# Patient Record
Sex: MALE | Race: WHITE | Employment: FULL TIME
[De-identification: names, ages, dates, MRNs, and addresses within clinical notes are randomized per-mention and may not be internally consistent; named-entity substitution may affect disease eponyms.]

---

## 2022-11-15 ENCOUNTER — NURSE TRIAGE (OUTPATIENT)
Dept: OTHER | Facility: CLINIC | Age: 32
End: 2022-11-15

## 2022-11-15 NOTE — TELEPHONE ENCOUNTER
Location of patient: [facility] call from [facility] at [facility] AND [facility]; Patient with Red Flag Complaint requesting to establish care with [place]. Subjective: Caller states \"high blood pressure\"     Current Symptoms: see above, he went to [facility] for employment physical, bp was 115/110, could not get clearance for new job. Denies symptoms. Hx of partial hip replacement    Onset: 2 weeks ago; sudden    Associated Symptoms: NA    Pain Severity: 0/10; N/A; none    Temperature: n/a     What has been tried: nothing    LMP: NA Pregnant: NA    Recommended disposition: See PCP within 3 Days, writer advised [facility], transferred to [facility] ([facility]) for new patient appointment. Care advice provided, patient verbalizes understanding; denies any other questions or concerns; instructed to call back for any new or worsening symptoms. Patient/Caller agrees with recommended disposition; writer provided warm transfer to [facility] at [facility] AND [facility] for appointment scheduling    Attention Provider: Thank you for allowing me to participate in the care of your patient. The patient was connected to triage in response to information provided to the [facility]. Please do not respond through this encounter as the response is not directed to a shared pool.     Reason for Disposition   Systolic BP >= 918 OR Diastolic >= 529    Protocols used: Blood Pressure - High-ADULT-OH

## 2022-11-16 ENCOUNTER — OFFICE VISIT (OUTPATIENT)
Dept: FAMILY MEDICINE CLINIC | Age: 32
End: 2022-11-16
Payer: COMMERCIAL

## 2022-11-16 ENCOUNTER — TELEPHONE (OUTPATIENT)
Dept: FAMILY MEDICINE CLINIC | Age: 32
End: 2022-11-16

## 2022-11-16 VITALS
WEIGHT: 242 LBS | HEART RATE: 109 BPM | DIASTOLIC BLOOD PRESSURE: 92 MMHG | BODY MASS INDEX: 38.89 KG/M2 | HEIGHT: 66 IN | OXYGEN SATURATION: 96 % | SYSTOLIC BLOOD PRESSURE: 130 MMHG

## 2022-11-16 DIAGNOSIS — F90.9 ATTENTION DEFICIT HYPERACTIVITY DISORDER (ADHD), UNSPECIFIED ADHD TYPE: ICD-10-CM

## 2022-11-16 DIAGNOSIS — J45.20 MILD INTERMITTENT ASTHMA WITHOUT COMPLICATION: ICD-10-CM

## 2022-11-16 DIAGNOSIS — I10 ESSENTIAL HYPERTENSION: Primary | ICD-10-CM

## 2022-11-16 DIAGNOSIS — K21.00 GASTROESOPHAGEAL REFLUX DISEASE WITH ESOPHAGITIS WITHOUT HEMORRHAGE: ICD-10-CM

## 2022-11-16 PROBLEM — J45.909 ASTHMA: Status: ACTIVE | Noted: 2022-11-16

## 2022-11-16 PROCEDURE — 99204 OFFICE O/P NEW MOD 45 MIN: CPT | Performed by: STUDENT IN AN ORGANIZED HEALTH CARE EDUCATION/TRAINING PROGRAM

## 2022-11-16 PROCEDURE — G8427 DOCREV CUR MEDS BY ELIG CLIN: HCPCS | Performed by: STUDENT IN AN ORGANIZED HEALTH CARE EDUCATION/TRAINING PROGRAM

## 2022-11-16 PROCEDURE — 3080F DIAST BP >= 90 MM HG: CPT | Performed by: STUDENT IN AN ORGANIZED HEALTH CARE EDUCATION/TRAINING PROGRAM

## 2022-11-16 PROCEDURE — 4004F PT TOBACCO SCREEN RCVD TLK: CPT | Performed by: STUDENT IN AN ORGANIZED HEALTH CARE EDUCATION/TRAINING PROGRAM

## 2022-11-16 PROCEDURE — G8417 CALC BMI ABV UP PARAM F/U: HCPCS | Performed by: STUDENT IN AN ORGANIZED HEALTH CARE EDUCATION/TRAINING PROGRAM

## 2022-11-16 PROCEDURE — G8484 FLU IMMUNIZE NO ADMIN: HCPCS | Performed by: STUDENT IN AN ORGANIZED HEALTH CARE EDUCATION/TRAINING PROGRAM

## 2022-11-16 PROCEDURE — 3075F SYST BP GE 130 - 139MM HG: CPT | Performed by: STUDENT IN AN ORGANIZED HEALTH CARE EDUCATION/TRAINING PROGRAM

## 2022-11-16 RX ORDER — ALBUTEROL SULFATE 90 UG/1
2 AEROSOL, METERED RESPIRATORY (INHALATION) 4 TIMES DAILY PRN
Qty: 18 G | Refills: 5 | Status: SHIPPED | OUTPATIENT
Start: 2022-11-16

## 2022-11-16 RX ORDER — PANTOPRAZOLE SODIUM 40 MG/1
TABLET, DELAYED RELEASE ORAL
COMMUNITY
Start: 2022-10-05 | End: 2022-11-29 | Stop reason: SDUPTHER

## 2022-11-16 RX ORDER — BUPROPION HYDROCHLORIDE 300 MG/1
TABLET ORAL
COMMUNITY
Start: 2022-11-02

## 2022-11-16 SDOH — ECONOMIC STABILITY: FOOD INSECURITY: WITHIN THE PAST 12 MONTHS, YOU WORRIED THAT YOUR FOOD WOULD RUN OUT BEFORE YOU GOT MONEY TO BUY MORE.: NEVER TRUE

## 2022-11-16 SDOH — ECONOMIC STABILITY: FOOD INSECURITY: WITHIN THE PAST 12 MONTHS, THE FOOD YOU BOUGHT JUST DIDN'T LAST AND YOU DIDN'T HAVE MONEY TO GET MORE.: NEVER TRUE

## 2022-11-16 ASSESSMENT — PATIENT HEALTH QUESTIONNAIRE - PHQ9
SUM OF ALL RESPONSES TO PHQ QUESTIONS 1-9: 2
SUM OF ALL RESPONSES TO PHQ QUESTIONS 1-9: 2
1. LITTLE INTEREST OR PLEASURE IN DOING THINGS: 1
SUM OF ALL RESPONSES TO PHQ QUESTIONS 1-9: 2
SUM OF ALL RESPONSES TO PHQ QUESTIONS 1-9: 2
2. FEELING DOWN, DEPRESSED OR HOPELESS: 1
SUM OF ALL RESPONSES TO PHQ9 QUESTIONS 1 & 2: 2

## 2022-11-16 ASSESSMENT — ENCOUNTER SYMPTOMS
ABDOMINAL PAIN: 0
SORE THROAT: 0
SHORTNESS OF BREATH: 0

## 2022-11-16 ASSESSMENT — SOCIAL DETERMINANTS OF HEALTH (SDOH): HOW HARD IS IT FOR YOU TO PAY FOR THE VERY BASICS LIKE FOOD, HOUSING, MEDICAL CARE, AND HEATING?: NOT VERY HARD

## 2022-11-16 NOTE — TELEPHONE ENCOUNTER
Pt states he missed a call from Dr. La Carbone on which pharmacy he uses. He said  [facility] on [address] In [place].

## 2022-11-16 NOTE — PROGRESS NOTES
[facility]  Establish care visit       Karina Medina (:  [phone]) is a 27 y.o. male, here to establish care. Chief Complaint   Patient presents with    Blood Pressure Check          ASSESSMENT/ PLAN  1. Essential hypertension  Blood pressure only slightly elevated today, while not on medication. We will continue dietary changes. Patient with blood pressure of 130/92.  92 is only slightly elevated with a goal of diastolic being under 90. Therefore, signed patient paperwork that he is okay to participate in his work health examination at this time. We will have patient follow-up in 3 months. 2. Gastroesophageal reflux disease with esophagitis without hemorrhage  Patient is on pantoprazole at this time. Follows with gastroenterology for Lockwood's esophagus. Has an upcoming follow-up appointment with them. Patient continues to take pantoprazole at this time. 3. Mild intermittent asthma without complication  Not very well controlled. Patient with regular symptoms weekly. Patient not on any controller medication, nor is he on rescue inhaler. Will start trial of rescue inhaler and follow-up with patient over the course of the next couple of months to try to get better control of his asthma symptoms. - albuterol sulfate HFA (VENTOLIN HFA) 108 (90 Base) MCG/ACT inhaler; Inhale 2 puffs into the lungs 4 times daily as needed for Wheezing  Dispense: 18 g; Refill: 5    4. Attention deficit hyperactivity disorder (ADHD), unspecified ADHD type  Stable while not on medication. It is possible that patient may slowly be growing out of his childhood ADHD symptoms, they are definitely less severe than the used to be according to the patient. Outside records from previous provider including lab work and outside office visit notes were reviewed. No follow-ups on file.     HPI  Patient is a 75-year-old male with a past medical history of GERD, hypertension, asthma, and ADHD, who presents to establish care and to discuss some of the above concerns. In regard to his acid reflux, the patient is on pantoprazole, which she has recently started over the past 1 to 2 months. Patient reports that he follows with a gastroenterologist, who told him that he had esophageal polyps that were precancerous, patient reports being told that he had Lockwood's esophagus. Patient continues to follow with [facility] [facility]. Patient continues to take pantoprazole medication and does not need any refills of this medication at this time. In regard to his hypertension, the patient reports that he was recently attempting to get initial testing done for a new job, but had elevated blood pressure at the time. Because of this, patient was told to get cleared by a physician before he could participate in the activity. Patient saw his previous provider, who noted a blood pressure of 144/110, and patient was not permitted to continue with the work testing. Today, however, blood pressure is noted to be only slightly elevated at 130/92. Patient does not measure his blood pressure at home. Patient denies headache, chest pain, shortness of breath, new leg swelling. In regard to his history of asthma, the patient does not have albuterol. He smokes 1.5 to 2 packs/day. He is precontemplative about quitting. He reports regular symptoms and may need an albuterol inhaler to help control symptoms. He also reports having seasonal allergies, which can sometimes have an effect on his asthma. Patient has a history of ADHD during childhood. He reports that has gotten a little bit better over the course of him getting older. He has not on treatment, but is able to tolerate his symptoms with work at this time. Per chart review, patient had a lot of lab work completed in April and last month with the gastroenterologist.  Patient is not due for any specific lab work at this time.   Patient follows no particular diet, often does not eat fruits and vegetables. He gets exercise at work, as he has a very active job. He uses tobacco products 1.5 to 2 packs/day. He has smoked 1.5 packs/day for the past 15 years. He occasionally uses alcohol. He does not use drugs. ROS  Review of Systems   Constitutional:  Negative for fever. HENT:  Negative for sore throat. Eyes:  Negative for visual disturbance. Respiratory:  Negative for shortness of breath. Cardiovascular:  Negative for chest pain, palpitations and leg swelling. Gastrointestinal:  Negative for abdominal pain. Genitourinary:  Negative for difficulty urinating. Skin:  Negative for rash. Neurological:  Negative for dizziness. Hematological:  Does not bruise/bleed easily. HISTORIES  Current Outpatient Medications on File Prior to Visit   Medication Sig Dispense Refill    pantoprazole (PROTONIX) 40 MG tablet       buPROPion (WELLBUTRIN XL) 300 MG extended release tablet        No current facility-administered medications on file prior to visit. Allergies   Allergen Reactions    Eggs Or Egg-Derived Products        History reviewed. No pertinent past medical history. Patient Active Problem List   Diagnosis    Asthma    Attention deficit hyperactivity disorder (ADHD)    Essential hypertension    GERD (gastroesophageal reflux disease)    Juvenile osteochondrosis of hip and pelvis    Primary osteoarthritis of left hip         History reviewed. No pertinent surgical history.     Social History     Socioeconomic History    Marital status: Single     Spouse name: Not on file    Number of children: Not on file    Years of education: Not on file    Highest education level: Not on file   Occupational History    Not on file   Tobacco Use    Smoking status: Every Day     Packs/day: 1.50     Years: 15.00     Pack years: 22.50     Types: Cigarettes    Smokeless tobacco: Never   Substance and Sexual Activity    Alcohol use: Not on file    Drug use: Not on file    Sexual activity: Not on file   Other Topics Concern    Not on file   Social History Narrative    Not on file     Social Determinants of Health     Financial Resource Strain: Low Risk     Difficulty of Paying Living Expenses: Not very hard   Food Insecurity: No Food Insecurity    Worried About Running Out of Food in the Last Year: Never true    Ran Out of Food in the Last Year: Never true   Transportation Needs: Not on file   Physical Activity: Not on file   Stress: Not on file   Social Connections: Not on file   Intimate Partner Violence: Not on file   Housing Stability: Not on file        History reviewed. No pertinent family history. PE  Vitals:    11/16/22 0725   BP: (!) 130/92   Site: Left Upper Arm   Position: Sitting   Pulse: (!) 109   SpO2: 96%   Weight: 242 lb (109.8 kg)   Height: 5' 6\" (1.676 m)     Estimated body mass index is 39.06 kg/m² as calculated from the following:    Height as of this encounter: 5' 6\" (1.676 m). Weight as of this encounter: 242 lb (109.8 kg). Physical Exam  Vitals reviewed. Constitutional:       General: He is not in acute distress. Appearance: Normal appearance. He is not ill-appearing, toxic-appearing or diaphoretic. HENT:      Head: Normocephalic and atraumatic. Right Ear: External ear normal.      Left Ear: External ear normal.      Mouth/Throat:      Mouth: Mucous membranes are moist.   Eyes:      General: No scleral icterus. Conjunctiva/sclera: Conjunctivae normal.   Cardiovascular:      Rate and Rhythm: Normal rate and regular rhythm. Heart sounds: Normal heart sounds. No murmur heard. No gallop. Pulmonary:      Effort: Pulmonary effort is normal. No respiratory distress. Breath sounds: Normal breath sounds. No wheezing. Abdominal:      General: There is no distension. Musculoskeletal:      Cervical back: Normal range of motion. Right lower leg: No edema. Left lower leg: No edema.    Skin:     General: Skin is warm and dry. Neurological:      Mental Status: He is alert. Mental status is at baseline. Psychiatric:         Behavior: Behavior normal.       Immunization History   Administered Date(s) Administered    DT (pediatric) 01/08/2001    DTP 1988, 1988, 03/02/1992, 09/19/1994    Hepatitis B Adol 2 Dose (Recombivax HB) 11/30/2000, 01/08/2001, 08/13/2001    Hib vaccine 01/24/1999    MMR 01/24/1991, 09/19/1994    PPD Test 07/06/1989    Pneumococcal Polysaccharide ([id number]) 11/16/1992    Polio OPV 1988, 1988, 03/02/1992, 09/19/1994    Tdap (Boostrix, Adacel) 04/02/2012       Health Maintenance   Topic Date Due    COVID-19 Vaccine (1) Never done    Varicella vaccine (1 of 2 - 2-dose childhood series) Never done    HIV screen  Never done    Hepatitis C screen  Never done    DTaP/Tdap/Td vaccine (7 - Td or Tdap) 04/02/2022    Flu vaccine (1) Never done    Depression Screen  11/16/2023    Pneumococcal 0-64 years Vaccine  Completed    Hepatitis A vaccine  Aged Out    Hib vaccine  Aged Out    Meningococcal (ACWY) vaccine  Aged Out       PSH, PMH, SH and FH reviewed and noted. Recent and past labs, tests and consults also reviewed. Recent or new meds also reviewed. Ty Krabbe, MD    This dictation was generated by voice recognition computer software. Although all attempts are made to edit the dictation for accuracy, there may be errors in the transcription that are not intended.

## 2022-11-29 ENCOUNTER — HOSPITAL ENCOUNTER (EMERGENCY)
Age: 32
Discharge: HOME | End: 2022-11-29
Attending: EMERGENCY MEDICINE
Payer: COMMERCIAL

## 2022-11-29 VITALS
HEART RATE: 89 BPM | OXYGEN SATURATION: 17 % | RESPIRATION RATE: 16 BRPM | TEMPERATURE: 98 F | BODY MASS INDEX: 40.21 KG/M2 | WEIGHT: 249.12 LBS | DIASTOLIC BLOOD PRESSURE: 89 MMHG | SYSTOLIC BLOOD PRESSURE: 138 MMHG

## 2022-11-29 DIAGNOSIS — K85.90 ACUTE PANCREATITIS, UNSPECIFIED COMPLICATION STATUS, UNSPECIFIED PANCREATITIS TYPE: Primary | ICD-10-CM

## 2022-11-29 DIAGNOSIS — R10.9 LEFT FLANK PAIN: ICD-10-CM

## 2022-11-29 LAB
A/G RATIO: 1.1 (ref 1.1–2.2)
ALBUMIN SERPL-MCNC: 3.7 G/DL (ref 3.4–5)
ALP BLD-CCNC: 84 U/L (ref 40–129)
ALT SERPL-CCNC: 58 U/L (ref 10–40)
ANION GAP SERPL CALCULATED.3IONS-SCNC: 13 MMOL/L (ref 3–16)
AST SERPL-CCNC: 38 U/L (ref 15–37)
BASOPHILS ABSOLUTE: 0 K/UL (ref 0–0.2)
BASOPHILS RELATIVE PERCENT: 0.2 %
BILIRUB SERPL-MCNC: 0.3 MG/DL (ref 0–1)
BILIRUBIN URINE: NEGATIVE
BLOOD, URINE: NEGATIVE
BUN BLDV-MCNC: 10 MG/DL (ref 7–20)
CALCIUM SERPL-MCNC: 9 MG/DL (ref 8.3–10.6)
CHLORIDE BLD-SCNC: 100 MMOL/L (ref 99–110)
CLARITY: CLEAR
CO2: 25 MMOL/L (ref 21–32)
COLOR: YELLOW
CREAT SERPL-MCNC: 0.7 MG/DL (ref 0.9–1.3)
EOSINOPHILS ABSOLUTE: 0.3 K/UL (ref 0–0.6)
EOSINOPHILS RELATIVE PERCENT: 3 %
GFR SERPL CREATININE-BSD FRML MDRD: >60 ML/MIN/{1.73_M2}
GLUCOSE BLD-MCNC: 156 MG/DL (ref 70–99)
GLUCOSE URINE: NEGATIVE MG/DL
HCT VFR BLD CALC: 46.9 % (ref 40.5–52.5)
HEMOGLOBIN: 16.2 G/DL (ref 13.5–17.5)
KETONES, URINE: NEGATIVE MG/DL
LEUKOCYTE ESTERASE, URINE: NEGATIVE
LIPASE: 120 U/L (ref 13–60)
LYMPHOCYTES ABSOLUTE: 3.9 K/UL (ref 1–5.1)
LYMPHOCYTES RELATIVE PERCENT: 41.4 %
MCH RBC QN AUTO: 31 PG (ref 26–34)
MCHC RBC AUTO-ENTMCNC: 34.5 G/DL (ref 31–36)
MCV RBC AUTO: 89.9 FL (ref 80–100)
MICROSCOPIC EXAMINATION: NORMAL
MONOCYTES ABSOLUTE: 0.4 K/UL (ref 0–1.3)
MONOCYTES RELATIVE PERCENT: 3.8 %
NEUTROPHILS ABSOLUTE: 4.9 K/UL (ref 1.7–7.7)
NEUTROPHILS RELATIVE PERCENT: 51.6 %
NITRITE, URINE: NEGATIVE
PDW BLD-RTO: 12.6 % (ref 12.4–15.4)
PH UA: 5.5 (ref 5–8)
PLATELET # BLD: 250 K/UL (ref 135–450)
PMV BLD AUTO: 8.9 FL (ref 5–10.5)
POTASSIUM REFLEX MAGNESIUM: 4 MMOL/L (ref 3.5–5.1)
PROTEIN UA: NEGATIVE MG/DL
RBC # BLD: 5.22 M/UL (ref 4.2–5.9)
SODIUM BLD-SCNC: 138 MMOL/L (ref 136–145)
SPECIFIC GRAVITY UA: 1.02 (ref 1–1.03)
TOTAL PROTEIN: 7 G/DL (ref 6.4–8.2)
URINE REFLEX TO CULTURE: NORMAL
URINE TYPE: NORMAL
UROBILINOGEN, URINE: 1 E.U./DL
WBC # BLD: 9.4 K/UL (ref 4–11)

## 2022-11-29 PROCEDURE — 83690 ASSAY OF LIPASE: CPT

## 2022-11-29 PROCEDURE — 6370000000 HC RX 637 (ALT 250 FOR IP): Performed by: EMERGENCY MEDICINE

## 2022-11-29 PROCEDURE — 99283 EMERGENCY DEPT VISIT LOW MDM: CPT

## 2022-11-29 PROCEDURE — 80053 COMPREHEN METABOLIC PANEL: CPT

## 2022-11-29 PROCEDURE — 85025 COMPLETE CBC W/AUTO DIFF WBC: CPT

## 2022-11-29 PROCEDURE — 81003 URINALYSIS AUTO W/O SCOPE: CPT

## 2022-11-29 RX ORDER — FAMOTIDINE 20 MG/1
20 TABLET, FILM COATED ORAL ONCE
Status: COMPLETED | OUTPATIENT
Start: 2022-11-29 | End: 2022-11-29

## 2022-11-29 RX ORDER — PANTOPRAZOLE SODIUM 40 MG/1
40 TABLET, DELAYED RELEASE ORAL DAILY
Qty: 30 TABLET | Refills: 0 | Status: SHIPPED | OUTPATIENT
Start: 2022-11-29

## 2022-11-29 RX ORDER — ONDANSETRON 4 MG/1
4 TABLET, ORALLY DISINTEGRATING ORAL 3 TIMES DAILY PRN
Qty: 21 TABLET | Refills: 0 | Status: SHIPPED | OUTPATIENT
Start: 2022-11-29

## 2022-11-29 RX ORDER — ACETAMINOPHEN 500 MG
1000 TABLET ORAL EVERY 8 HOURS PRN
Qty: 40 TABLET | Refills: 0 | Status: SHIPPED | OUTPATIENT
Start: 2022-11-29

## 2022-11-29 RX ADMIN — FAMOTIDINE 20 MG: 20 TABLET, FILM COATED ORAL at 08:12

## 2022-11-29 ASSESSMENT — PAIN DESCRIPTION - ORIENTATION
ORIENTATION: LEFT
ORIENTATION: LEFT

## 2022-11-29 ASSESSMENT — PAIN DESCRIPTION - LOCATION
LOCATION: FLANK
LOCATION: FLANK

## 2022-11-29 ASSESSMENT — PAIN SCALES - GENERAL
PAINLEVEL_OUTOF10: 5
PAINLEVEL_OUTOF10: 5

## 2022-11-29 ASSESSMENT — PAIN - FUNCTIONAL ASSESSMENT: PAIN_FUNCTIONAL_ASSESSMENT: 0-10

## 2022-11-29 NOTE — ED PROVIDER NOTES
CHIEF COMPLAINT  Flank Pain (Abd amd left sided flank pain starting about 5 days ago. )      HISTORY OF PRESENT ILLNESS  Tae Mackenzie is a 27 y.o. male who has past medical history of GERD presents to the ED complaining of left upper quadrant abdominal pain that is under his ribs is been present over the past 5 days. Patient states the pain is a constant aching sensation and does not radiate. States the pain gets slightly worse with eating. Denies any associated nausea or vomiting. States that sometimes he can lay a specific way and the pain does subside some. Denies any fevers or chills. No lower abdominal pain. Normal urinary habits. States he has chronic diarrhea which is unchanged from prior. Denies any bright red blood per rectum or melanotic stools. No fevers or chills. No other complaints, modifying factors or associated symptoms. Pt was seen during the [place] 19 pandemic. Appropriate PPE worn by ME during patient encounters. Patient was cared for during a time with constrained hospital bed capacity with nationwide stress on resources and staffing. Nursing notes reviewed. History reviewed. No pertinent past medical history. History reviewed. No pertinent surgical history. History reviewed. No pertinent family history.   Social History     Socioeconomic History    Marital status: Single     Spouse name: Not on file    Number of children: Not on file    Years of education: Not on file    Highest education level: Not on file   Occupational History    Not on file   Tobacco Use    Smoking status: Every Day     Packs/day: 1.50     Years: 15.00     Pack years: 22.50     Types: Cigarettes    Smokeless tobacco: Never   Substance and Sexual Activity    Alcohol use: Not on file    Drug use: Not on file    Sexual activity: Not on file   Other Topics Concern    Not on file   Social History Narrative    Not on file     Social Determinants of Health     Financial Resource Strain: Low Risk Difficulty of Paying Living Expenses: Not very hard   Food Insecurity: No Food Insecurity    Worried About Running Out of Food in the Last Year: Never true    Ran Out of Food in the Last Year: Never true   Transportation Needs: Not on file   Physical Activity: Not on file   Stress: Not on file   Social Connections: Not on file   Intimate Partner Violence: Not on file   Housing Stability: Not on file     No current facility-administered medications for this encounter.      Current Outpatient Medications   Medication Sig Dispense Refill    acetaminophen (TYLENOL) 500 MG tablet Take 2 tablets by mouth every 8 hours as needed for Fever 40 tablet 0    ondansetron (ZOFRAN-ODT) 4 MG disintegrating tablet Take 1 tablet by mouth 3 times daily as needed for Nausea or Vomiting 21 tablet 0    pantoprazole (PROTONIX) 40 MG tablet Take 1 tablet by mouth daily 30 tablet 0    buPROPion (WELLBUTRIN XL) 300 MG extended release tablet       albuterol sulfate HFA (VENTOLIN HFA) 108 (90 Base) MCG/ACT inhaler Inhale 2 puffs into the lungs 4 times daily as needed for Wheezing 18 g 5     Allergies   Allergen Reactions    Eggs Or Egg-Derived Products          REVIEW OF SYSTEMS  (up to 7 for level 4, 8 or more for level 5) pertinent positives per HPI otherwise noted to be negative    PHYSICAL EXAM  /89   Pulse 89   Temp 98 °F (36.7 °C) (Temporal)   Resp 16   Wt 249 lb 1.9 oz (113 kg)   SpO2 (!) 17%   BMI 40.21 kg/m²      CONSTITUTIONAL: AOx4, cooperative with exam, afebrile   HEAD: normocephalic, atraumatic   EYES: PERRL, EOMI, anicteric sclera   ENT: Moist mucous membranes, uvula midline   BACK: Symmetric, no deformity, no CVA tenderness   LUNGS: Bilateral breath sounds, CTAB, no rales/ronchi/wheezes   CARDIOVASCULAR: RRR, normal S1/S2, no m/r/g, 2+ pulses throughout   ABDOMEN: Soft, obese abdomen, left upper quadrant abdominal tenderness, no rebound tenderness or guarding, non-distended, +BS   NEUROLOGIC:  MAEx4, GCS 15 MUSCULOSKELETAL: No clubbing, cyanosis or edema   SKIN: No rash, pallor or wounds on exposed surfaces         RADIOLOGY  X-RAYS:  I have reviewed radiologic plain film image(s). ALL OTHER NON-PLAIN FILM IMAGES SUCH AS CT, ULTRASOUND AND MRI HAVE BEEN READ BY THE RADIOLOGIST. No orders to display          EKG INTERPRETATION  None    PROCEDURES      ED COURSE/MDM  GERD, gastritis, PUD, pancreatitis, biliary colic, cholecystitis,  Patient seen and evaluated. History and physical as above. Nontoxic, afebrile. Patient with complaints of left flank pain and left upper quadrant abdominal pain over the past week. Does have some left upper quadrant tenderness on exam.  No rebound tenderness or guarding, no peritoneal signs. Will obtain urinalysis, routine abdominal labs and provide Pepcid for patient's symptoms. Patient agreeable. ED Course as of 11/30/22 1345   Tue Nov 29, 2022   0920 WBC 9.4, hemoglobin 16.2. Urinalysis negative for infection. No blood in the urine. Sodium 138, testing 4.0, creatinine 0.7 BUN of 10. Glucose 156. /CO2 25 with anion gap of 13. Patient's lipase 120 consistent with patient's pain and pancreatitis. [DS]   1100 Discussed starting patient on Pepcid, Zofran and Tylenol as well as bowel rest for his pancreatitis. Patient tolerating oral intake therefore I do feel patient is appropriate for discharge with outpatient follow-up. Patient agreeable with care plan. Stressed importance of staying well-hydrated. Strict return instruction provided. All questions answered prior to discharge. Patient's last O2 saturation documented was 17% although this was likely his respiratory rate. Patient's oxygen saturation has remained in the high 90s throughout his entire ED stay. [DS]      ED Course User Index  [DS] Earlene Dang MD       Is this patient to be included in the SEP-1 Core Measure due to severe sepsis or septic shock?    No   Exclusion criteria - the patient is NOT to be included for SEP-1 Core Measure due to: Infection is not suspected      I estimate there is LOW risk for ACUTE APPENDICITIS, BOWEL OBSTRUCTION, CHOLECYSTITIS, DIVERTICULITIS, INCARCERATED HERNIA, or PERFORATED BOWEL or ULCER, thus I consider the discharge disposition reasonable. Also, there is no evidence or peritonitis, sepsis, or toxicity. Tae Mackenzie and I have discussed the diagnosis and risks, and we agree with discharging home to follow-up with their primary doctor. We also discussed returning to the Emergency Department immediately if new or worsening symptoms occur. We have discussed the symptoms which are most concerning (e.g., bloody stool, fever, changing or worsening pain, vomiting) that necessitate immediate return. Patient was given scripts for the following medications. I counseled patient how to take these medications. Discharge Medication List as of 11/29/2022 11:14 AM        START taking these medications    Details   acetaminophen (TYLENOL) 500 MG tablet Take 2 tablets by mouth every 8 hours as needed for Fever, Disp-40 tablet, R-0Normal      ondansetron (ZOFRAN-ODT) 4 MG disintegrating tablet Take 1 tablet by mouth 3 times daily as needed for Nausea or Vomiting, Disp-21 tablet, R-0Normal                 CLINICAL IMPRESSION  1. Acute pancreatitis, unspecified complication status, unspecified pancreatitis type    2. Left flank pain        Blood pressure 138/89, pulse 89, temperature 98 °F (36.7 °C), temperature source Temporal, resp. rate 16, weight 249 lb 1.9 oz (113 kg), SpO2 (!) 17 %. DISPOSITION  Patient was discharged to home in good condition. Kenzie Flores MD  [address] [place] [id number] [phone]    Call today  For a follow up appointment. Disclaimer: All medical record entries made by [employer].       (Please note that this note was completed with a voice recognition program. Every attempt was made to edit the dictations, but inevitably there remain words that are mis-transcribed.)            Amna Benson MD  11/30/22 4505

## 2022-11-29 NOTE — DISCHARGE INSTRUCTIONS
Call today or tomorrow to follow up with Kaiden Bassett MD  in 5-7 days. Use ibuprofen or Tylenol (unless prescribed medications that have Tylenol in it) for pain. You can take over the counter Ibuprofen (advil) tablets (4 tablets every 8 hours or 3 tablets every 6 hours or 2 tablets every 4 hours)    Do not drink any alcohol. Avoid spicy foods / Fatty foods. Return to the Emergency Department for worsening of symptoms. Excessive nausea or vomiting, throwing up blood, black stools, any other care or concern.

## 2023-08-18 ENCOUNTER — OFFICE VISIT (OUTPATIENT)
Dept: FAMILY MEDICINE CLINIC | Age: 33
End: 2023-08-18

## 2023-08-18 VITALS
SYSTOLIC BLOOD PRESSURE: 139 MMHG | WEIGHT: 236.8 LBS | BODY MASS INDEX: 38.06 KG/M2 | HEART RATE: 91 BPM | OXYGEN SATURATION: 95 % | DIASTOLIC BLOOD PRESSURE: 78 MMHG | HEIGHT: 66 IN

## 2023-08-18 DIAGNOSIS — J02.9 SORE THROAT: ICD-10-CM

## 2023-08-18 DIAGNOSIS — J45.20 MILD INTERMITTENT ASTHMA WITHOUT COMPLICATION: ICD-10-CM

## 2023-08-18 DIAGNOSIS — K21.00 GASTROESOPHAGEAL REFLUX DISEASE WITH ESOPHAGITIS WITHOUT HEMORRHAGE: ICD-10-CM

## 2023-08-18 DIAGNOSIS — J02.9 ACUTE VIRAL PHARYNGITIS: Primary | ICD-10-CM

## 2023-08-18 DIAGNOSIS — K22.70 BARRETT'S ESOPHAGUS WITHOUT DYSPLASIA: ICD-10-CM

## 2023-08-18 LAB
INFLUENZA A ANTIGEN, POC: NEGATIVE
INFLUENZA B ANTIGEN, POC: NEGATIVE
LOT EXPIRE DATE: NORMAL
LOT KIT NUMBER: NORMAL
S PYO AG THROAT QL: NORMAL
SARS-COV-2, POC: NORMAL
VALID INTERNAL CONTROL: NORMAL
VENDOR AND KIT NAME POC: NORMAL

## 2023-08-18 RX ORDER — METHYLPREDNISOLONE 4 MG/1
TABLET ORAL
Qty: 1 KIT | Refills: 0 | Status: SHIPPED | OUTPATIENT
Start: 2023-08-18 | End: 2023-08-24

## 2023-08-18 RX ORDER — ALBUTEROL SULFATE 90 UG/1
2 AEROSOL, METERED RESPIRATORY (INHALATION) 4 TIMES DAILY PRN
Qty: 18 G | Refills: 5 | Status: SHIPPED | OUTPATIENT
Start: 2023-08-18

## 2023-08-18 RX ORDER — PANTOPRAZOLE SODIUM 40 MG/1
40 TABLET, DELAYED RELEASE ORAL DAILY
Qty: 30 TABLET | Refills: 2 | Status: SHIPPED | OUTPATIENT
Start: 2023-08-18

## 2023-08-18 ASSESSMENT — PATIENT HEALTH QUESTIONNAIRE - PHQ9
2. FEELING DOWN, DEPRESSED OR HOPELESS: 0
1. LITTLE INTEREST OR PLEASURE IN DOING THINGS: 0
SUM OF ALL RESPONSES TO PHQ QUESTIONS 1-9: 0
SUM OF ALL RESPONSES TO PHQ9 QUESTIONS 1 & 2: 0
SUM OF ALL RESPONSES TO PHQ QUESTIONS 1-9: 0

## 2023-08-18 NOTE — PROGRESS NOTES
Briana Cardoza (:  [phone]) is a 26 y.o. male,Established patient, here for evaluation of the following chief complaint(s):  Pharyngitis and Otalgia         ASSESSMENT/PLAN:  1. Acute viral pharyngitis  -     methylPREDNISolone (MEDROL DOSEPACK) 4 MG tablet; Take by mouth per package instructions, Disp-1 kit, R-0Normal  2. Sore throat  -     POCT rapid strep A  -     POCT COVID-19 & Influenza A/B  Acute. POC testing was negative. Patient has severe sore throat. We will treat as viral pharyngitis, but will provide Medrol Dosepak to help with significant sore throat at this time. Continue to advise Flonase, Claritin, NeilMed sinus rinses, and other conservative management at this time. 3. Mild intermittent asthma without complication  -     albuterol sulfate HFA (VENTOLIN HFA) 108 (90 Base) MCG/ACT inhaler; Inhale 2 puffs into the lungs 4 times daily as needed for Wheezing, Disp-18 g, R-5May switch to another albuterol rescue inhaler if not coveredNormal  Chronic. Stable. Uses albuterol intermittently, especially when he is sick. Needs a refill. Will provide at this time. 4. Gastroesophageal reflux disease with esophagitis without hemorrhage  -     pantoprazole (PROTONIX) 40 MG tablet; Take 1 tablet by mouth daily, Disp-30 tablet, R-2Normal  5. Lockwood's esophagus without dysplasia  Chronic. Stable when on medication. Unstable and not on medication. Patient has been out of medication so he has been unstable. Will provide medication at this time. No follow-ups on file. Subjective   SUBJECTIVE/OBJECTIVE:  HPI  Patient is a 57-year-old male, who presents for both acute and chronic symptoms. Patient reports that he has been feeling sick for the past couple of days. He denies fever, nausea, vomiting, chest pain, shortness of breath, wheezing, changes in taste, changes in smell, constipation.   He endorses chronic diarrhea, new sore throat, productive cough, initial rhinorrhea that

## 2024-08-19 ENCOUNTER — OFFICE VISIT (OUTPATIENT)
Dept: FAMILY MEDICINE CLINIC | Age: 34
End: 2024-08-19
Payer: COMMERCIAL

## 2024-08-19 VITALS
HEART RATE: 84 BPM | WEIGHT: 231 LBS | BODY MASS INDEX: 37.28 KG/M2 | OXYGEN SATURATION: 98 % | DIASTOLIC BLOOD PRESSURE: 98 MMHG | SYSTOLIC BLOOD PRESSURE: 140 MMHG

## 2024-08-19 DIAGNOSIS — R20.0 NUMBNESS AND TINGLING IN BOTH HANDS: Primary | ICD-10-CM

## 2024-08-19 DIAGNOSIS — I10 PRIMARY HYPERTENSION: ICD-10-CM

## 2024-08-19 DIAGNOSIS — K21.00 GASTROESOPHAGEAL REFLUX DISEASE WITH ESOPHAGITIS WITHOUT HEMORRHAGE: ICD-10-CM

## 2024-08-19 DIAGNOSIS — R20.2 NUMBNESS AND TINGLING IN BOTH HANDS: Primary | ICD-10-CM

## 2024-08-19 PROCEDURE — 99214 OFFICE O/P EST MOD 30 MIN: CPT | Performed by: NURSE PRACTITIONER

## 2024-08-19 PROCEDURE — 3080F DIAST BP >= 90 MM HG: CPT | Performed by: NURSE PRACTITIONER

## 2024-08-19 PROCEDURE — 3077F SYST BP >= 140 MM HG: CPT | Performed by: NURSE PRACTITIONER

## 2024-08-19 RX ORDER — LISINOPRIL 10 MG/1
10 TABLET ORAL DAILY
Qty: 30 TABLET | Refills: 1 | Status: SHIPPED | OUTPATIENT
Start: 2024-08-19

## 2024-08-19 RX ORDER — PREDNISONE 10 MG/1
TABLET ORAL
Qty: 18 TABLET | Refills: 0 | Status: SHIPPED | OUTPATIENT
Start: 2024-08-19

## 2024-08-19 RX ORDER — PANTOPRAZOLE SODIUM 40 MG/1
40 TABLET, DELAYED RELEASE ORAL DAILY
Qty: 30 TABLET | Refills: 2 | Status: SHIPPED | OUTPATIENT
Start: 2024-08-19

## 2024-08-19 SDOH — ECONOMIC STABILITY: INCOME INSECURITY: HOW HARD IS IT FOR YOU TO PAY FOR THE VERY BASICS LIKE FOOD, HOUSING, MEDICAL CARE, AND HEATING?: SOMEWHAT HARD

## 2024-08-19 SDOH — ECONOMIC STABILITY: FOOD INSECURITY: WITHIN THE PAST 12 MONTHS, THE FOOD YOU BOUGHT JUST DIDN'T LAST AND YOU DIDN'T HAVE MONEY TO GET MORE.: SOMETIMES TRUE

## 2024-08-19 SDOH — ECONOMIC STABILITY: FOOD INSECURITY: WITHIN THE PAST 12 MONTHS, YOU WORRIED THAT YOUR FOOD WOULD RUN OUT BEFORE YOU GOT MONEY TO BUY MORE.: SOMETIMES TRUE

## 2024-08-19 ASSESSMENT — PATIENT HEALTH QUESTIONNAIRE - PHQ9
SUM OF ALL RESPONSES TO PHQ9 QUESTIONS 1 & 2: 0
1. LITTLE INTEREST OR PLEASURE IN DOING THINGS: NOT AT ALL
SUM OF ALL RESPONSES TO PHQ QUESTIONS 1-9: 0
2. FEELING DOWN, DEPRESSED OR HOPELESS: NOT AT ALL
SUM OF ALL RESPONSES TO PHQ QUESTIONS 1-9: 0

## 2024-08-19 ASSESSMENT — ENCOUNTER SYMPTOMS: SHORTNESS OF BREATH: 0

## 2024-08-19 NOTE — PROGRESS NOTES
Patient: Deacon Hill is a 34 y.o. male who presents today with the following Chief Complaint(s):  Chief Complaint   Patient presents with    Hand Pain     Bilateral, numbness in the arm, thinks it may be a pinched nerve in the neck area          HPI  Here with c/o Bilateral numbness and tingling- has been going on for 1-2 years- now daily- neck pain- no injury he is aware. Hands lock up off and on as well. Has not taken any meds at home yet. Tried copper gloves today- helped for a little but but not helping now.     HTN: looks like BP was elevated last year- borderline 's70's-80's  Today is higher 140/100 with repeat 140/98. No headache, chest pain, SOB or dizziness.       Current Outpatient Medications   Medication Sig Dispense Refill    pantoprazole (PROTONIX) 40 MG tablet Take 1 tablet by mouth daily 30 tablet 2    predniSONE (DELTASONE) 10 MG tablet Take 3 tabs for days 1-3, take 2 tabs day 4-6, take 1 tab days 7-9. 18 tablet 0    lisinopril (PRINIVIL;ZESTRIL) 10 MG tablet Take 1 tablet by mouth daily 30 tablet 1    albuterol sulfate HFA (VENTOLIN HFA) 108 (90 Base) MCG/ACT inhaler Inhale 2 puffs into the lungs 4 times daily as needed for Wheezing 18 g 5     No current facility-administered medications for this visit.       Patient's past medical history, surgical history, family history, medications,  andallergies  were all reviewed and updated as appropriate today.      Review of Systems   Respiratory:  Negative for shortness of breath.    Cardiovascular:  Negative for chest pain and palpitations.   Neurological:  Positive for numbness (both hands/fingertips,unable to  things at times- hand/wrist). Negative for dizziness, light-headedness and headaches.         Physical Exam  Vitals and nursing note reviewed.   Constitutional:       Appearance: Normal appearance. He is well-developed.   HENT:      Head: Normocephalic and atraumatic.      Right Ear: Tympanic membrane and external ear normal.

## 2024-08-29 SDOH — HEALTH STABILITY: PHYSICAL HEALTH: ON AVERAGE, HOW MANY DAYS PER WEEK DO YOU ENGAGE IN MODERATE TO STRENUOUS EXERCISE (LIKE A BRISK WALK)?: 0 DAYS

## 2024-08-30 ENCOUNTER — OFFICE VISIT (OUTPATIENT)
Dept: ORTHOPEDIC SURGERY | Age: 34
End: 2024-08-30

## 2024-08-30 VITALS — HEIGHT: 65 IN | WEIGHT: 240 LBS | BODY MASS INDEX: 39.99 KG/M2

## 2024-08-30 DIAGNOSIS — M79.642 BILATERAL HAND PAIN: Primary | ICD-10-CM

## 2024-08-30 DIAGNOSIS — M79.641 BILATERAL HAND PAIN: Primary | ICD-10-CM

## 2024-08-30 NOTE — PROGRESS NOTES
Family History     Family History   Problem Relation Age of Onset    Hypertension Mother     Hypertension Maternal Grandmother     Hypertension Maternal Grandfather        Physical Exam     Vital Signs:  Ht 1.651 m (5' 5\")   Wt 108.9 kg (240 lb)   BMI 39.94 kg/m²   BMI: Body mass index is 39.94 kg/m².    General appearance: healthy, alert, no distress, appropriate for stated age  HEENT: normocephalic, atraumatic  Respiratory exam: Breathing easy and unlabored on room air  Cardiovascular: Extremities warm and well perfused with brisk capillary refill, no significant edema.  Lymphatic: No obvious lymphadenopathy  Neuro: Alert and oriented to person, place, time, and situation. No focal, motor, or sensory deficit except as noted below.  Psychiatric: Mood and affect normal and appropriate       Bilateral hand Exam     Skin:  Normal appearance and texture, consistent with age. No rashes. No significant lesions or abnormalities.    Vascular: All fingertips are pink with good capillary refill and of normal temperature.  No edema.  No areas of ischemia or ulcers. Radial pulse 2+.    Joint Stability: No obvious dislocation, subluxation or significant laxity in either upper extremity.    Negative thenar atrophy    Sensory Exam: Decreased sensation to light touch to all fingers    Motor Exam:  5/5 strength of APB    Provacative Tests:  Tinel's over carpal tunnel: Negative  Compression test over carpal tunnel: Negative  Spurling's test: Negative      Radiographs & Electrodiagnostic studies     None    Assessment     Deacon Hill is a 34 y.o. male with possible bilateral carpal tunnel syndrome.    Plan     We discussed this with chronic problem including diagnosis, prognosis, and treatment options along with risks, benefits, and alternatives. We discussed the pathophysiology of carpal tunnel syndrome, which is a chronic compressive neuropathy of the median nerve at the wrist. This compression of the transverse carpal  ligament causes symptoms of pain, paresthesias and eventual weakness of the hand.  It was explained that diagnosis and evaluation of severity of carpal tunnel syndrome can be determined with a nerve study. I have ordered an EMG to be obtained to evaluate for carpal tunnel syndrome.  I also provide him with bilateral wrist splints to wear at night until I see him back.  All questions were answered and patient agrees the plan.        No orders of the defined types were placed in this encounter.

## 2024-09-24 ENCOUNTER — OFFICE VISIT (OUTPATIENT)
Dept: FAMILY MEDICINE CLINIC | Age: 34
End: 2024-09-24
Payer: COMMERCIAL

## 2024-09-24 VITALS
WEIGHT: 234.8 LBS | HEART RATE: 104 BPM | HEIGHT: 65 IN | SYSTOLIC BLOOD PRESSURE: 128 MMHG | BODY MASS INDEX: 39.12 KG/M2 | OXYGEN SATURATION: 97 % | DIASTOLIC BLOOD PRESSURE: 70 MMHG

## 2024-09-24 DIAGNOSIS — Z00.00 WELL ADULT EXAM: Primary | ICD-10-CM

## 2024-09-24 PROCEDURE — 99395 PREV VISIT EST AGE 18-39: CPT | Performed by: STUDENT IN AN ORGANIZED HEALTH CARE EDUCATION/TRAINING PROGRAM

## 2024-09-24 PROCEDURE — 3074F SYST BP LT 130 MM HG: CPT | Performed by: STUDENT IN AN ORGANIZED HEALTH CARE EDUCATION/TRAINING PROGRAM

## 2024-09-24 PROCEDURE — 3078F DIAST BP <80 MM HG: CPT | Performed by: STUDENT IN AN ORGANIZED HEALTH CARE EDUCATION/TRAINING PROGRAM

## 2025-01-31 ENCOUNTER — TELEPHONE (OUTPATIENT)
Dept: ORTHOPEDIC SURGERY | Age: 35
End: 2025-01-31

## 2025-01-31 NOTE — TELEPHONE ENCOUNTER
PT IS REQUESTING CALL BACK IN REGARDS TO SCHEDULING SURGERY FOR BOTH HANDS. PT CA BE REACHED [phone]

## 2025-02-07 ENCOUNTER — OFFICE VISIT (OUTPATIENT)
Dept: ORTHOPEDIC SURGERY | Age: 35
End: 2025-02-07

## 2025-02-07 ENCOUNTER — TELEPHONE (OUTPATIENT)
Dept: ORTHOPEDIC SURGERY | Age: 35
End: 2025-02-07

## 2025-02-07 VITALS — BODY MASS INDEX: 39.99 KG/M2 | WEIGHT: 240 LBS | HEIGHT: 65 IN

## 2025-02-07 DIAGNOSIS — G56.01 RIGHT CARPAL TUNNEL SYNDROME: ICD-10-CM

## 2025-02-07 DIAGNOSIS — G56.02 LEFT CARPAL TUNNEL SYNDROME: Primary | ICD-10-CM

## 2025-02-07 DIAGNOSIS — M54.2 CERVICAL PAIN: ICD-10-CM

## 2025-02-07 NOTE — PROGRESS NOTES
Hand, Upper Extremity and Reconstructive Surgery                Ewa Gee MD                                           Summary of Upper Extremity Problems and Interventions     Diagnosis: Eval for bilateral carpal tunnel syndrome    History of Present Illness     History of Present Illness  The patient presents for evaluation of bilateral carpal tunnel syndrome.He has been utilizing nocturnal wrist splints without significant symptomatic relief.  The patient describes intermittent paresthesia in his hands, occasionally accompanied by radiating pain in the arms. Additionally, he reports persistent neck and back pain, which has not been previously assessed. The onset of numbness varies, sometimes originating from the cervical region and other times from the hands. He notes hand tingling during the use of tools such as drills or wrenches.        Deacon Hill is a 34 y.o. right hand dominant male who presents with numbness and tingling in his bilateral hands. Symptoms have been ongoing for years and getting progressively worse. Reports occasional night time symptoms. His symptoms are constant. His numbness affects his whole hands bilaterally.  His primary care provider prescribed oral steroids which did not help with symptoms. Patient was referred by his PCP Saranya Morris whose note was reviewed.     Electrodiagnostic Studies: none    Pertinent past medical history:    Diabetes none     Thyroid disease none    Autoimmune disease none   Wrist trauma none    Occupation: Managed by [employer]    Allergies     Allergies   Allergen Reactions    Egg-Derived Products        Home Medications     Current Outpatient Medications   Medication Instructions    albuterol sulfate HFA (VENTOLIN HFA) 108 (90 Base) MCG/ACT inhaler 2 puffs, Inhalation, 4 TIMES DAILY PRN    lisinopril (PRINIVIL;ZESTRIL) 10 mg, Oral, DAILY    pantoprazole (PROTONIX) 40 mg, Oral, DAILY       Past Medical History   No